# Patient Record
Sex: MALE | HISPANIC OR LATINO | Employment: STUDENT | URBAN - METROPOLITAN AREA
[De-identification: names, ages, dates, MRNs, and addresses within clinical notes are randomized per-mention and may not be internally consistent; named-entity substitution may affect disease eponyms.]

---
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
## 2024-06-24 ENCOUNTER — HOSPITAL ENCOUNTER (EMERGENCY)
Facility: HOSPITAL | Age: 19
Discharge: HOME | End: 2024-06-24
Admitting: STUDENT IN AN ORGANIZED HEALTH CARE EDUCATION/TRAINING PROGRAM
Payer: COMMERCIAL

## 2024-06-24 VITALS
WEIGHT: 230 LBS | BODY MASS INDEX: 32.93 KG/M2 | SYSTOLIC BLOOD PRESSURE: 136 MMHG | DIASTOLIC BLOOD PRESSURE: 72 MMHG | HEIGHT: 70 IN | HEART RATE: 78 BPM | TEMPERATURE: 98.6 F | RESPIRATION RATE: 16 BRPM | OXYGEN SATURATION: 100 %

## 2024-06-24 DIAGNOSIS — R51.9 ACUTE NONINTRACTABLE HEADACHE, UNSPECIFIED HEADACHE TYPE: ICD-10-CM

## 2024-06-24 DIAGNOSIS — M62.838 MUSCLE SPASM: ICD-10-CM

## 2024-06-24 PROCEDURE — 250N000011 HC RX IP 250 OP 636: Mod: JZ | Performed by: STUDENT IN AN ORGANIZED HEALTH CARE EDUCATION/TRAINING PROGRAM

## 2024-06-24 PROCEDURE — 258N000003 HC RX IP 258 OP 636: Mod: JZ | Performed by: STUDENT IN AN ORGANIZED HEALTH CARE EDUCATION/TRAINING PROGRAM

## 2024-06-24 PROCEDURE — 96375 TX/PRO/DX INJ NEW DRUG ADDON: CPT

## 2024-06-24 PROCEDURE — 96361 HYDRATE IV INFUSION ADD-ON: CPT

## 2024-06-24 PROCEDURE — 99284 EMERGENCY DEPT VISIT MOD MDM: CPT | Mod: 25

## 2024-06-24 PROCEDURE — 96374 THER/PROPH/DIAG INJ IV PUSH: CPT | Mod: 59

## 2024-06-24 RX ORDER — METOCLOPRAMIDE HYDROCHLORIDE 5 MG/ML
10 INJECTION INTRAMUSCULAR; INTRAVENOUS ONCE
Status: COMPLETED | OUTPATIENT
Start: 2024-06-24 | End: 2024-06-24

## 2024-06-24 RX ORDER — DIPHENHYDRAMINE HYDROCHLORIDE 50 MG/ML
25 INJECTION INTRAMUSCULAR; INTRAVENOUS ONCE
Status: COMPLETED | OUTPATIENT
Start: 2024-06-24 | End: 2024-06-24

## 2024-06-24 RX ORDER — CYCLOBENZAPRINE HCL 10 MG
10 TABLET ORAL 3 TIMES DAILY PRN
Qty: 10 TABLET | Refills: 0 | Status: SHIPPED | OUTPATIENT
Start: 2024-06-24

## 2024-06-24 RX ORDER — KETOROLAC TROMETHAMINE 15 MG/ML
15 INJECTION, SOLUTION INTRAMUSCULAR; INTRAVENOUS ONCE
Status: COMPLETED | OUTPATIENT
Start: 2024-06-24 | End: 2024-06-24

## 2024-06-24 RX ADMIN — DIPHENHYDRAMINE HYDROCHLORIDE 25 MG: 50 INJECTION INTRAMUSCULAR; INTRAVENOUS at 10:51

## 2024-06-24 RX ADMIN — SODIUM CHLORIDE 1000 ML: 9 INJECTION, SOLUTION INTRAVENOUS at 10:46

## 2024-06-24 RX ADMIN — KETOROLAC TROMETHAMINE 15 MG: 15 INJECTION, SOLUTION INTRAMUSCULAR; INTRAVENOUS at 10:52

## 2024-06-24 RX ADMIN — METOCLOPRAMIDE 10 MG: 5 INJECTION, SOLUTION INTRAMUSCULAR; INTRAVENOUS at 10:51

## 2024-06-24 ASSESSMENT — COLUMBIA-SUICIDE SEVERITY RATING SCALE - C-SSRS
2. HAVE YOU ACTUALLY HAD ANY THOUGHTS OF KILLING YOURSELF IN THE PAST MONTH?: NO
6. HAVE YOU EVER DONE ANYTHING, STARTED TO DO ANYTHING, OR PREPARED TO DO ANYTHING TO END YOUR LIFE?: NO
1. IN THE PAST MONTH, HAVE YOU WISHED YOU WERE DEAD OR WISHED YOU COULD GO TO SLEEP AND NOT WAKE UP?: NO

## 2024-06-24 ASSESSMENT — VISUAL ACUITY
OS: 20/20
OD: 20/20
OU: NORMAL

## 2024-06-24 ASSESSMENT — ACTIVITIES OF DAILY LIVING (ADL)
ADLS_ACUITY_SCORE: 33
ADLS_ACUITY_SCORE: 35
ADLS_ACUITY_SCORE: 35

## 2024-06-24 NOTE — DISCHARGE INSTRUCTIONS
You were seen in the emergency department today for evaluation of a headache.  Your symptoms improved substantially with migraine cocktail here.    You may take Ibuprofen up to 400 mg by mouth every 4-6 hours as needed for pain. Do not exceed 2400 mg/day.  You may take Tylenol 325-1000 mg by mouth every 4-6 hours as needed for pain. Do not exceed 1000mg (1g) in 4 hours or 4 g/day from all sources.  You may combine Tylenol and Ibuprofen- up to 400 mg of ibuprofen and 1000 mg of Tylenol at the same time, up to 3 times a day for the pain  As discussed, you can also  some Excedrin and try this for your headaches.    Very tender to palpation over your right-sided trap and neck muscles and I suspect that this muscle tension is likely contributing to your headache.  Given you a limited supply of muscle relaxer called Flexeril, you can take this up to 3 times daily to help with muscle spasm and releasing muscle tension.  You cannot drive or operate heavy machinery while you take this I recommend taking this when you are relaxing around the house or sleeping.    I also recommend application of a heating pad over her shoulder and neck for 10 to 15 minutes every few hours to help release muscle spasm.  You can also try using a Theracane, tennis ball, or other similar products to help release muscle tension in your shoulders and upper back. I recommend limiting your upper body lifting for the next few days until your symptoms come down.    I have placed a primary care referral for you to follow-up regarding the headache and to establish care.    Return to the emergency department if you develop any significant blurry vision or vision loss, become very dizzy or unsteady when walking, or develop severe worsened headache.

## 2024-06-24 NOTE — ED TRIAGE NOTES
Patient here with headache in the occipital region. Denies being hit in head or any head trauma recently. Denies vision changes.

## 2024-06-24 NOTE — ED PROVIDER NOTES
Emergency Department Encounter   NAME: Kam Erickson ; AGE: 19 year old male ; YOB: 2005 ; MRN: [id number] ; PCP: Braulio Rose   ED PROVIDER: Tanika Cuevas PA-C    Evaluation Date & Time:   6/24/2024  9:53 AM    CHIEF COMPLAINT:  Headache        Impression and Plan   FINAL IMPRESSION:    ICD-10-CM    1. Muscle spasm  M62.838 Primary Care Referral      2. Acute nonintractable headache, unspecified headache type  R51.9 Primary Care Referral          MDM:  Kam is a 19 year old male with PMH of allergic rhinitis presenting to the ER for headache. He states waking up feeling okay but around 8:30 o'clock this morning he developed severe suboccipital pain that involved his right upper back. He states he initially took tylenol around 9:30AM and this helped to bring his headache from a 10/10 to an 8/10 but he feels it is now starting to wear off. Endorses some photosensitivity, denies any vision loss or blurry vision. No known trauma or recent head injuries. Endorses doing heavy lifting recently at the gym. He states this happened one other time but improved pretty quickly after tylenol.    Vitals reviewed and unremarkable. On exam he is resting comfortably.  Speaking in full sentences. Squinting at the lights.  Differential diagnosis includes but not limited to muscle spasm, tension headache, migraine headache, concussion, carotid/vertebral artery dissection, intracranial hemorrhage, renal mass, stroke. He is very tender to palpation over the right sided subocciptal region and extending down his right upper trapezius. His neurologic exam today is without any focal deficits.  He does not have any blurry vision or vision changes and does not have any gait ataxia, dizziness, or lightheadedness.  I have very low suspicion for vertebral artery dissection, stroke, or acute intracranial pathology and do not feel CT head or other head imaging is indicated today. He is an otherwise healthy 19 year old  male.    He endorses improvement in his headache from an 8/10 to a 1/10 with migraine cocktail of IV NS, Toradol, Reglan, and Benadryl. This is overall very reassuring. Given he is very tender to palpation over the right-sided upper trap and shoulder as it extends up into the suboccipital region I suspect his headache is likely provoked by muscle tension/spasm.  I recommended scheduled Tylenol and ibuprofen as well as rest and limiting upper body lifting for the next few days.  I have given him some Flexeril to take as needed for help with muscle spasm. I also recommended use of heat and light stretching to help with muscle relaxation. Patient was given a PCP referral as he does not currently have one to establish primary care and so that he can follow up in a few days to ensure he is improving. He was educated on concerning symptoms and return to the emergency department and is understanding and agreeable to this plan.        History:  Supplemental history from: Documented in chart  External Record(s) reviewed: Documented in chart    Work Up:  Chart documentation includes differential considered and any EKGs or imaging independently interpreted by provider, where specified.  In additional to work up documented, I considered the following work up: CT head, MR brain    External consultation:  Discussion of management with another provider: Documented in chart, if applicable    Complicating factors:  Care impacted by chronic illness: N/A  Care affected by social determinants of health: N/A    Disposition considerations: Discharge. I prescribed additional prescription strength medication(s) as charted. See documentation for any additional details.      ED COURSE:  10:10 AM I met and introduced myself to the patient. I gathered initial history and performed my physical exam. We discussed plan for initial workup.   12:15 PM I rechecked the patient and discussed results, discharge, follow up, and reasons to return to the  ED.     At the conclusion of the encounter I discussed the results of all the tests and the disposition. The questions were answered. The patient or family acknowledged understanding and was agreeable with the care plan.      MEDICATIONS GIVEN IN THE EMERGENCY DEPARTMENT:  Medications   sodium chloride 0.9% BOLUS 1,000 mL (0 mLs Intravenous Stopped 6/24/24 1203)   ketorolac (TORADOL) injection 15 mg (15 mg Intravenous $Given 6/24/24 1052)   metoclopramide (REGLAN) injection 10 mg (10 mg Intravenous $Given 6/24/24 1051)   diphenhydrAMINE (BENADRYL) injection 25 mg (25 mg Intravenous $Given 6/24/24 1051)         NEW PRESCRIPTIONS STARTED AT TODAY'S ED VISIT:  New Prescriptions    CYCLOBENZAPRINE (FLEXERIL) 10 MG TABLET    Take 1 tablet (10 mg) by mouth 3 times daily as needed for muscle spasms         HPI   Patient information was obtained from: patient, patient's sister  Use of Intrepreter: N/A     Kam is a 19 year old male with PMH of allergic rhinitis presenting to the ER for headache. He states waking up feeling okay but around 8:30 o'clock this morning he developed severe suboccipital pain that involved his right upper back. He states he initially took tylenol around 9:30AM and this helped to bring his headache from a 10/10 to an 8/10 but he feels it is now starting to wear off. Endorses some photosensitivity, denies any vision loss or blurry vision. No known trauma or recent head injuries. Endorses doing heavy lifting recently at the gym. He states this happened one other time but improved pretty quickly after tylenol.      Medical History     No past medical history on file.    No past surgical history on file.    No family history on file.         cyclobenzaprine (FLEXERIL) 10 MG tablet          Physical Exam     First Vitals:  Patient Vitals for the past 24 hrs:   BP Temp Temp src Pulse Resp SpO2 Height Weight   06/24/24 1211 136/72 -- -- 78 16 100 % -- --   06/24/24 0951 (!) 144/91 -- -- 74 16 97 % 1.778 m  "(5' 10\") 104.3 kg (230 lb)   06/24/24 0949 -- 98.6  F (37  C) Temporal -- -- -- -- --         PHYSICAL EXAM    General Appearance:  Alert, cooperative, no distress, appears stated age. Squinting due to photosensitivity.  HENT: Normocephalic without obvious deformity, atraumatic. Mucous membranes moist   Eyes: Conjunctiva clear, Lids normal. No discharge.   Respiratory: No distress. Lungs clear to ausculation bilaterally. No wheezes, rhonchi or stridor  Cardiovascular: Regular rate and rhythm, no murmur. Normal cap refill. No peripheral edema  Musculoskeletal: Moving all extremities. No gross deformities. He is very tender to palpation over the right sided subocciptal region and extending down his right upper trapezius. No midline spinal tenderness or step off deformities.  Integument: Warm, dry, no rashes or lesions  Neurologic: Alert and orientated x3. No focal deficits. GCS 15. Cranial nerves III through XII intact.  No facial asymmetry.  Sensation to light touch intact to face and all extremities.  Finger/nose/finger intact.  Negative pronator drift.  Intact straight leg raise.  5 out of 5 strength with , flexion extension at elbow joints, flexion at shoulder and hip joint against resistance.  Dorsiflexion and plantarflexion are intact.  Answering questions appropriately with normal speech.  No aphasia or dysarthria.  Following commands.  Intact visual fields. Visual acuity 20/20 bilaterally.  Psych: Normal mood and affect        Results     LAB:  All pertinent labs reviewed and interpreted  Labs Ordered and Resulted from Time of ED Arrival to Time of ED Departure - No data to display    RADIOLOGY:  No orders to display         ECG:  N/A      PROCEDURES:  N/A      Tanika Cuevas PA-C   Emergency Medicine   [facility] EMERGENCY DEPARTMENT       Tanika Cuevas PA-C  06/24/24 2141    "

## 2025-05-24 ENCOUNTER — HOSPITAL ENCOUNTER (EMERGENCY)
Facility: HOSPITAL | Age: 20
Discharge: HOME | End: 2025-05-24
Attending: EMERGENCY MEDICINE | Admitting: EMERGENCY MEDICINE
Payer: COMMERCIAL

## 2025-05-24 ENCOUNTER — APPOINTMENT (OUTPATIENT)
Dept: RADIOLOGY | Facility: HOSPITAL | Age: 20
End: 2025-05-24
Attending: EMERGENCY MEDICINE
Payer: COMMERCIAL

## 2025-05-24 VITALS
BODY MASS INDEX: 34.3 KG/M2 | SYSTOLIC BLOOD PRESSURE: 139 MMHG | WEIGHT: 245 LBS | RESPIRATION RATE: 18 BRPM | HEIGHT: 71 IN | OXYGEN SATURATION: 98 % | HEART RATE: 80 BPM | DIASTOLIC BLOOD PRESSURE: 82 MMHG | TEMPERATURE: 98.3 F

## 2025-05-24 DIAGNOSIS — S60.419A ABRASION OF FINGER, INITIAL ENCOUNTER: ICD-10-CM

## 2025-05-24 PROCEDURE — 99284 EMERGENCY DEPT VISIT MOD MDM: CPT | Performed by: EMERGENCY MEDICINE

## 2025-05-24 PROCEDURE — 73130 X-RAY EXAM OF HAND: CPT | Mod: RT

## 2025-05-24 PROCEDURE — 250N000013 HC RX MED GY IP 250 OP 250 PS 637: Performed by: EMERGENCY MEDICINE

## 2025-05-24 PROCEDURE — 250N000009 HC RX 250: Performed by: EMERGENCY MEDICINE

## 2025-05-24 RX ORDER — DOXYCYCLINE 100 MG/1
100 CAPSULE ORAL ONCE
Status: COMPLETED | OUTPATIENT
Start: 2025-05-24 | End: 2025-05-24

## 2025-05-24 RX ORDER — DOXYCYCLINE 100 MG/1
100 CAPSULE ORAL 2 TIMES DAILY
Qty: 20 CAPSULE | Refills: 0 | Status: SHIPPED | OUTPATIENT
Start: 2025-05-24 | End: 2025-06-03

## 2025-05-24 RX ORDER — GINSENG 100 MG
CAPSULE ORAL ONCE
Status: COMPLETED | OUTPATIENT
Start: 2025-05-24 | End: 2025-05-24

## 2025-05-24 RX ORDER — METRONIDAZOLE 500 MG/1
500 TABLET ORAL ONCE
Status: COMPLETED | OUTPATIENT
Start: 2025-05-24 | End: 2025-05-24

## 2025-05-24 RX ORDER — METRONIDAZOLE 500 MG/1
500 TABLET ORAL 3 TIMES DAILY
Qty: 30 TABLET | Refills: 0 | Status: SHIPPED | OUTPATIENT
Start: 2025-05-24 | End: 2025-06-03

## 2025-05-24 RX ADMIN — DOXYCYCLINE 100 MG: 100 CAPSULE ORAL at 07:35

## 2025-05-24 RX ADMIN — BACITRACIN: 500 OINTMENT TOPICAL at 07:35

## 2025-05-24 RX ADMIN — METRONIDAZOLE 500 MG: 500 TABLET ORAL at 07:35

## 2025-05-24 ASSESSMENT — COLUMBIA-SUICIDE SEVERITY RATING SCALE - C-SSRS
1. IN THE PAST MONTH, HAVE YOU WISHED YOU WERE DEAD OR WISHED YOU COULD GO TO SLEEP AND NOT WAKE UP?: NO
6. HAVE YOU EVER DONE ANYTHING, STARTED TO DO ANYTHING, OR PREPARED TO DO ANYTHING TO END YOUR LIFE?: NO
2. HAVE YOU ACTUALLY HAD ANY THOUGHTS OF KILLING YOURSELF IN THE PAST MONTH?: NO

## 2025-05-24 ASSESSMENT — ACTIVITIES OF DAILY LIVING (ADL): ADLS_ACUITY_SCORE: 41
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
## 2025-05-24 NOTE — DISCHARGE INSTRUCTIONS
Return to the ER if you develop fever or hand/finger redness that could suggest that an infection is setting in. Take the antibiotics as directed and be careful not to mis any doses. Rarely, despite antibiotics, finger infections can happen after being cut by a tooth so it is important that if you do develop an infection (redness, pain,swelling, fever) even though you're taking antibiotics to prevent infection, that you come back to the ER for IV antibiotics if the pills aren't adequate.
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
